# Patient Record
Sex: FEMALE | Race: WHITE | Employment: UNEMPLOYED | URBAN - METROPOLITAN AREA
[De-identification: names, ages, dates, MRNs, and addresses within clinical notes are randomized per-mention and may not be internally consistent; named-entity substitution may affect disease eponyms.]

---
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
## 2024-03-07 ENCOUNTER — APPOINTMENT (OUTPATIENT)
Facility: HOSPITAL | Age: 19
End: 2024-03-07
Payer: MEDICAID

## 2024-03-07 ENCOUNTER — HOSPITAL ENCOUNTER (EMERGENCY)
Facility: HOSPITAL | Age: 19
Discharge: HOME | End: 2024-03-07
Payer: MEDICAID

## 2024-03-07 VITALS
BODY MASS INDEX: 24.16 KG/M2 | OXYGEN SATURATION: 96 % | RESPIRATION RATE: 16 BRPM | WEIGHT: 127.87 LBS | SYSTOLIC BLOOD PRESSURE: 103 MMHG | TEMPERATURE: 98 F | DIASTOLIC BLOOD PRESSURE: 65 MMHG | HEART RATE: 81 BPM

## 2024-03-07 DIAGNOSIS — S69.82XA HYPEREXTENSION INJURY OF FINGER OF LEFT HAND: ICD-10-CM

## 2024-03-07 DIAGNOSIS — S63.602A SPRAIN OF LEFT THUMB, UNSPECIFIED SITE OF DIGIT, INITIAL ENCOUNTER: Primary | ICD-10-CM

## 2024-03-07 PROCEDURE — 99283 EMERGENCY DEPT VISIT LOW MDM: CPT

## 2024-03-07 PROCEDURE — 6370000000 HC RX 637 (ALT 250 FOR IP): Performed by: PHYSICIAN ASSISTANT

## 2024-03-07 PROCEDURE — 73130 X-RAY EXAM OF HAND: CPT

## 2024-03-07 RX ORDER — IBUPROFEN 600 MG/1
600 TABLET ORAL
Status: COMPLETED | OUTPATIENT
Start: 2024-03-07 | End: 2024-03-07

## 2024-03-07 RX ADMIN — IBUPROFEN 600 MG: 600 TABLET, FILM COATED ORAL at 18:42

## 2024-03-07 ASSESSMENT — PAIN SCALES - GENERAL: PAINLEVEL_OUTOF10: 7

## 2024-03-07 NOTE — ED PROVIDER NOTES
[facility] EMERGENCY DEPT  EMERGENCY DEPARTMENT ENCOUNTER       Pt Name: Filomena Quintero  MRN: [id number]  Birthdate 2005  Date of Evaluation: 3/7/2024  Provider: Areli Ness PA-C   PCP: None, None  Note Started: 7:56 PM 3/7/24     CHIEF COMPLAINT       Chief Complaint   Patient presents with    Hand Injury     Pt hyperextended L thumb while trying to help her cat,no new scratches or bites        HISTORY OF PRESENT ILLNESS: 1 or more elements      History From: Patient  None     Filomena Quintero is a 18 y.o. female who presents to the ED today hyperextension injury to her left thumb.  Patient states that her cat was trying to eat a wire.  She states that she tried to spank her cat.  Hit her left thumb on the carpet and hyperextension injury.  Felt a pop sensation.  Has had pain since.  Has not taken anything prior to arrival for pain.  No other constitutional symptoms reported     Nursing Notes were all reviewed and agreed with or any disagreements were addressed in the HPI.     REVIEW OF SYSTEMS      Review of Systems     Positives and Pertinent negatives as per HPI.    PAST HISTORY     Past Medical History:  Past Medical History:   Diagnosis Date    Elbow fracture 2014    right, fell on playground, healed without residual       Past Surgical History:  History reviewed. No pertinent surgical history.    Family History:  Family History   Problem Relation Age of Onset    No Known Problems Father     Celiac Disease Mother     Cancer Maternal Grandmother     No Known Problems Sister        Social History:  Social History     Tobacco Use    Smoking status: Never    Smokeless tobacco: Never   Substance Use Topics    Alcohol use: Never    Drug use: No       Allergies:  No Known Allergies    CURRENT MEDICATIONS      Previous Medications    ACETAMINOPHEN (AMINOFEN) 325 MG TABLET    Take 2 tablets by mouth every 6 hours as needed for Pain    IBUPROFEN (IBU) 600 MG TABLET    Take 1 tablet by mouth every 6 hours

## 2024-04-29 ENCOUNTER — HOSPITAL ENCOUNTER (EMERGENCY)
Facility: HOSPITAL | Age: 19
Discharge: HOME | End: 2024-04-29
Payer: MEDICAID

## 2024-04-29 VITALS
WEIGHT: 126.76 LBS | HEART RATE: 72 BPM | TEMPERATURE: 99 F | BODY MASS INDEX: 23.95 KG/M2 | DIASTOLIC BLOOD PRESSURE: 73 MMHG | SYSTOLIC BLOOD PRESSURE: 102 MMHG | RESPIRATION RATE: 18 BRPM | OXYGEN SATURATION: 100 %

## 2024-04-29 DIAGNOSIS — S09.90XA INJURY OF HEAD, INITIAL ENCOUNTER: Primary | ICD-10-CM

## 2024-04-29 DIAGNOSIS — R51.9 ACUTE INTRACTABLE HEADACHE, UNSPECIFIED HEADACHE TYPE: ICD-10-CM

## 2024-04-29 PROCEDURE — 6370000000 HC RX 637 (ALT 250 FOR IP)

## 2024-04-29 PROCEDURE — 99283 EMERGENCY DEPT VISIT LOW MDM: CPT

## 2024-04-29 RX ORDER — IBUPROFEN 600 MG/1
600 TABLET ORAL
Status: COMPLETED | OUTPATIENT
Start: 2024-04-29 | End: 2024-04-29

## 2024-04-29 RX ORDER — BUTALBITAL, ACETAMINOPHEN AND CAFFEINE 50; 325; 40 MG/1; MG/1; MG/1
1 TABLET ORAL EVERY 4 HOURS PRN
Qty: 20 TABLET | Refills: 0 | Status: SHIPPED | OUTPATIENT
Start: 2024-04-29

## 2024-04-29 RX ORDER — ACETAMINOPHEN 325 MG/1
650 TABLET ORAL
Status: COMPLETED | OUTPATIENT
Start: 2024-04-29 | End: 2024-04-29

## 2024-04-29 RX ORDER — BUTALBITAL, ACETAMINOPHEN AND CAFFEINE 50; 325; 40 MG/1; MG/1; MG/1
1 TABLET ORAL
Status: COMPLETED | OUTPATIENT
Start: 2024-04-29 | End: 2024-04-29

## 2024-04-29 RX ORDER — IBUPROFEN 600 MG/1
600 TABLET ORAL EVERY 6 HOURS PRN
Qty: 40 TABLET | Refills: 0 | Status: SHIPPED | OUTPATIENT
Start: 2024-04-29

## 2024-04-29 RX ORDER — ACETAMINOPHEN 500 MG
500 TABLET ORAL EVERY 4 HOURS PRN
Qty: 540 TABLET | Refills: 1 | Status: SHIPPED | OUTPATIENT
Start: 2024-04-29

## 2024-04-29 RX ADMIN — BUTALBITAL, ACETAMINOPHEN, AND CAFFEINE 1 TABLET: 50; 325; 40 TABLET ORAL at 09:47

## 2024-04-29 RX ADMIN — ACETAMINOPHEN 650 MG: 325 TABLET ORAL at 09:47

## 2024-04-29 RX ADMIN — IBUPROFEN 600 MG: 600 TABLET, FILM COATED ORAL at 09:47

## 2024-04-29 ASSESSMENT — PAIN SCALES - GENERAL
PAINLEVEL_OUTOF10: 4
PAINLEVEL_OUTOF10: 4

## 2024-04-29 NOTE — ED NOTES
Pt discharged by DARIEN Lynn. Discharge instructions discussed and pt given opportunity to ask questions. Pt ambulatory out of ED with mother

## 2024-04-29 NOTE — ED PROVIDER NOTES
Unless otherwise noted above, none.  Procedures      CRITICAL CARE TIME   Patient does not meet Critical Care Time, 0 minutes    ED IMPRESSION     1. Injury of head, initial encounter    2. Acute intractable headache, unspecified headache type          DISPOSITION/PLAN   DISPOSITION Decision To Discharge 04/29/2024 10:36:32 AM    Discharge Note: The patient is stable for discharge home. The signs, symptoms, diagnosis, and discharge instructions have been discussed, understanding conveyed, and agreed upon. The patient is to follow up as recommended or return to ER should their symptoms worsen.      PATIENT REFERRED TO:  Your primary care provider      As needed        DISCHARGE MEDICATIONS:     Medication List        START taking these medications      butalbital-acetaminophen-caffeine -40 MG per tablet  Commonly known as: FIORICET, ESGIC  Take 1 tablet by mouth every 4 hours as needed for Headaches            CHANGE how you take these medications      * acetaminophen 325 MG tablet  Commonly known as: Aminofen  Take 2 tablets by mouth every 6 hours as needed for Pain  What changed: Another medication with the same name was added. Make sure you understand how and when to take each.     * acetaminophen 500 MG tablet  Commonly known as: TYLENOL  Take 1 tablet by mouth every 4 hours as needed for Pain  What changed: You were already taking a medication with the same name, and this prescription was added. Make sure you understand how and when to take each.     * ibuprofen 600 MG tablet  Commonly known as: IBU  Take 1 tablet by mouth every 6 hours as needed for Pain or Fever  What changed: Another medication with the same name was added. Make sure you understand how and when to take each.     * ibuprofen 600 MG tablet  Commonly known as: ADVIL;MOTRIN  Take 1 tablet by mouth every 6 hours as needed for Pain  What changed: You were already taking a medication with the same name, and this prescription was added. Make

## 2024-07-18 ENCOUNTER — OFFICE VISIT (OUTPATIENT)
Facility: CLINIC | Age: 19
End: 2024-07-18
Payer: MEDICAID

## 2024-07-18 VITALS
HEART RATE: 73 BPM | OXYGEN SATURATION: 98 % | SYSTOLIC BLOOD PRESSURE: 98 MMHG | TEMPERATURE: 98 F | WEIGHT: 121.4 LBS | RESPIRATION RATE: 16 BRPM | DIASTOLIC BLOOD PRESSURE: 56 MMHG | HEIGHT: 61 IN | BODY MASS INDEX: 22.92 KG/M2

## 2024-07-18 DIAGNOSIS — K90.49 FOOD INTOLERANCE: Primary | ICD-10-CM

## 2024-07-18 DIAGNOSIS — R53.83 OTHER FATIGUE: ICD-10-CM

## 2024-07-18 DIAGNOSIS — R10.84 GENERALIZED ABDOMINAL PAIN: ICD-10-CM

## 2024-07-18 LAB
COMMENT:: NORMAL
SPECIMEN HOLD: NORMAL

## 2024-07-18 PROCEDURE — 99215 OFFICE O/P EST HI 40 MIN: CPT | Performed by: NURSE PRACTITIONER

## 2024-07-18 SDOH — ECONOMIC STABILITY: FOOD INSECURITY: WITHIN THE PAST 12 MONTHS, YOU WORRIED THAT YOUR FOOD WOULD RUN OUT BEFORE YOU GOT MONEY TO BUY MORE.: NEVER TRUE

## 2024-07-18 SDOH — ECONOMIC STABILITY: FOOD INSECURITY: WITHIN THE PAST 12 MONTHS, THE FOOD YOU BOUGHT JUST DIDN'T LAST AND YOU DIDN'T HAVE MONEY TO GET MORE.: NEVER TRUE

## 2024-07-18 SDOH — ECONOMIC STABILITY: HOUSING INSECURITY
IN THE LAST 12 MONTHS, WAS THERE A TIME WHEN YOU DID NOT HAVE A STEADY PLACE TO SLEEP OR SLEPT IN A SHELTER (INCLUDING NOW)?: NO

## 2024-07-18 SDOH — ECONOMIC STABILITY: INCOME INSECURITY: HOW HARD IS IT FOR YOU TO PAY FOR THE VERY BASICS LIKE FOOD, HOUSING, MEDICAL CARE, AND HEATING?: SOMEWHAT HARD

## 2024-07-18 ASSESSMENT — PATIENT HEALTH QUESTIONNAIRE - PHQ9
1. LITTLE INTEREST OR PLEASURE IN DOING THINGS: SEVERAL DAYS
SUM OF ALL RESPONSES TO PHQ9 QUESTIONS 1 & 2: 2
SUM OF ALL RESPONSES TO PHQ QUESTIONS 1-9: 2
2. FEELING DOWN, DEPRESSED OR HOPELESS: SEVERAL DAYS
SUM OF ALL RESPONSES TO PHQ QUESTIONS 1-9: 2

## 2024-07-18 NOTE — PROGRESS NOTES
Filomena Quintero is a 18 y.o. female who presents with her mother to [facility].  Chief Complaint   Patient presents with    New Patient     Feels sick after eating meat and milk - unsure if allergic   Declines tdap       HPI  Former patient of Radhayaneth Marcelinoes    She is single, recently graduated HS - no work or college plans just yet  She is a non smoker, no etoh or drugs  Not currently dating, not yet sexually active    No significant MHx but does admit hx s/s depression/anxiety    She has one primary concern today: \"Stomach issues\"  Started about a year ago  She develops nausea with eating red meat  No vomiting or diarrhea  No abdominal pain    She does report hx insect bite of right abdomen last year, thought by ED to be spider bite    She also reports feeling queasy with drinking milk    Mom has celiac dz - Filomena does not necessarily have trouble with gluten    Will check celiac, alpha gal, blood counts, iron panel, TSH CMP, today    HM  Declines Tdap update  Reports she did have HPV vaccines         Vitals:    07/18/24 1349 07/18/24 1431   BP: (!) 103/97 (!) 98/56   Site: Right Upper Arm Right Upper Arm   Position: Sitting Sitting   Cuff Size: Medium Adult    Pulse: 73    Resp: 16    Temp: 98 °F (36.7 °C)    TempSrc: Oral    SpO2: 98%    Weight: 55.1 kg (121 lb 6.4 oz)    Height: 1.549 m (5' 1\")       Wt Readings from Last 3 Encounters:   07/18/24 55.1 kg (121 lb 6.4 oz) (37 %, Z= -0.32)*   04/29/24 57.5 kg (126 lb 12.2 oz) (49 %, Z= -0.02)*   03/07/24 58 kg (127 lb 13.9 oz) (52 %, Z= 0.05)*     * Growth percentiles are based on CDC (Girls, 2-20 Years) data.      Body mass index is 22.94 kg/m².     Review of Systems   Constitutional: Negative for headache, fever, and unexpected weight change.  Eyes: Negative for visual changes.  Respiratory: Negative for shortness of breath, wheezing.  Cardiovascular: Negative for chest pain, palpitations, shortness of breath, and leg swelling.  GI/: No changes in bowels

## 2024-07-18 NOTE — PROGRESS NOTES
\"Have you been to the ER, urgent care clinic since your last visit?  Hospitalized since your last visit?\"    ER visit from march and april    “Have you seen or consulted any other health care providers outside of [facility] since your last visit?”    NO            Click Here for Release of Records Request

## 2024-07-19 LAB
ALBUMIN SERPL-MCNC: 4.5 G/DL (ref 3.5–5)
ALBUMIN/GLOB SERPL: 1.7 (ref 1.1–2.2)
ALP SERPL-CCNC: 87 U/L (ref 45–117)
ALT SERPL-CCNC: 24 U/L (ref 12–78)
ANION GAP SERPL CALC-SCNC: 6 MMOL/L (ref 5–15)
AST SERPL-CCNC: 12 U/L (ref 15–37)
BASOPHILS # BLD: 0 K/UL (ref 0–0.1)
BASOPHILS NFR BLD: 1 % (ref 0–1)
BILIRUB SERPL-MCNC: 0.4 MG/DL (ref 0.2–1)
BUN SERPL-MCNC: 9 MG/DL (ref 6–20)
BUN/CREAT SERPL: 11 (ref 12–20)
CALCIUM SERPL-MCNC: 9.5 MG/DL (ref 8.5–10.1)
CHLORIDE SERPL-SCNC: 106 MMOL/L (ref 97–108)
CO2 SERPL-SCNC: 29 MMOL/L (ref 21–32)
CREAT SERPL-MCNC: 0.82 MG/DL (ref 0.55–1.02)
DIFFERENTIAL METHOD BLD: ABNORMAL
EOSINOPHIL # BLD: 0 K/UL (ref 0–0.4)
EOSINOPHIL NFR BLD: 0 % (ref 0–7)
ERYTHROCYTE [DISTWIDTH] IN BLOOD BY AUTOMATED COUNT: 12.7 % (ref 11.5–14.5)
GLOBULIN SER CALC-MCNC: 2.6 G/DL (ref 2–4)
GLUCOSE SERPL-MCNC: 108 MG/DL (ref 65–100)
HCT VFR BLD AUTO: 36.1 % (ref 35–47)
HGB BLD-MCNC: 12.6 G/DL (ref 11.5–16)
IMM GRANULOCYTES # BLD AUTO: 0 K/UL (ref 0–0.04)
IMM GRANULOCYTES NFR BLD AUTO: 1 % (ref 0–0.5)
IRON SATN MFR SERPL: 15 % (ref 20–50)
IRON SERPL-MCNC: 56 UG/DL (ref 35–150)
LYMPHOCYTES # BLD: 2.2 K/UL (ref 0.8–3.5)
LYMPHOCYTES NFR BLD: 33 % (ref 12–49)
MCH RBC QN AUTO: 30 PG (ref 26–34)
MCHC RBC AUTO-ENTMCNC: 34.9 G/DL (ref 30–36.5)
MCV RBC AUTO: 86 FL (ref 80–99)
MONOCYTES # BLD: 0.6 K/UL (ref 0–1)
MONOCYTES NFR BLD: 10 % (ref 5–13)
NEUTS SEG # BLD: 3.7 K/UL (ref 1.8–8)
NEUTS SEG NFR BLD: 55 % (ref 32–75)
NRBC # BLD: 0 K/UL (ref 0–0.01)
NRBC BLD-RTO: 0 PER 100 WBC
PLATELET # BLD AUTO: 270 K/UL (ref 150–400)
PMV BLD AUTO: 9.5 FL (ref 8.9–12.9)
POTASSIUM SERPL-SCNC: 3.8 MMOL/L (ref 3.5–5.1)
PROT SERPL-MCNC: 7.1 G/DL (ref 6.4–8.2)
RBC # BLD AUTO: 4.2 M/UL (ref 3.8–5.2)
SODIUM SERPL-SCNC: 141 MMOL/L (ref 136–145)
T4 FREE SERPL-MCNC: 1.2 NG/DL (ref 0.8–1.5)
TIBC SERPL-MCNC: 370 UG/DL (ref 250–450)
TSH SERPL DL<=0.05 MIU/L-ACNC: 2.79 UIU/ML (ref 0.36–3.74)
WBC # BLD AUTO: 6.5 K/UL (ref 3.6–11)

## 2024-07-20 LAB — Lab: NORMAL

## 2024-07-21 LAB
ENDOMYSIUM IGA SER QL: NEGATIVE
IGA SERPL-MCNC: 122 MG/DL (ref 87–352)
TTG IGA SER-ACNC: <2 U/ML (ref 0–3)

## 2024-07-22 ENCOUNTER — TELEPHONE (OUTPATIENT)
Facility: CLINIC | Age: 19
End: 2024-07-22

## 2024-07-22 NOTE — TELEPHONE ENCOUNTER
I called pt and LVM for pt to call me back about lab results.     Per Hansa.   Labs look fine, celiac screen is negative  Thyroid is WNL  Alpha gal allergy test is pending   pt denies any changes in vision

## 2024-07-23 LAB
ALLERGEN LAMB/MUTTON, IGE, AGAL3: <0.1 KU/L
ALPHA-GAL IGE QN: <0.1 KU/L
BEEF IGE QN: <0.1 KU/L
IGE SERPL-ACNC: 9 IU/ML (ref 6–495)
Lab: NORMAL
PORK IGE QN: <0.1 KU/L

## 2024-07-23 NOTE — TELEPHONE ENCOUNTER
I called and spoke to pt. Pt has additional questions about labs. Pt was wondering why Iron is low and Glucose was high.

## 2024-07-24 NOTE — TELEPHONE ENCOUNTER
Please let her know her iron saturation is a little low - but iron and blood counts are normal.  Will keep an eye on this, but not concerning.  Please let her know there glucose was likely >100 b/c she was non-fasting.  If she would like, she can come in and we can check her A1C with lab visit only, or we can wait until her physical in December.  Let me know what she prefers.

## 2024-08-07 ENCOUNTER — TELEPHONE (OUTPATIENT)
Facility: CLINIC | Age: 19
End: 2024-08-07

## 2024-08-07 DIAGNOSIS — R73.09 BLOOD GLUCOSE ABNORMAL: Primary | ICD-10-CM

## 2024-08-07 NOTE — TELEPHONE ENCOUNTER
I called and spoke to pt, verified name and . I called and verified the patient by name and date of birth, then proceeded with the message from the provider. The patient understood and had no questions at this time.     Pt would like to come in for a lab visit to check A1C.     Per Hansa:   Your iron saturation is a little low - but iron and blood counts are normal.  Will keep an eye on this, but not concerning.Your glucose was likely greater than 100 b/c you was non-fasting.If you would like, you can come in and we can check your A1C with lab visit only, or we can wait until your physical in December.

## 2025-01-22 ENCOUNTER — HOSPITAL ENCOUNTER (EMERGENCY)
Facility: HOSPITAL | Age: 20
Discharge: HOME | End: 2025-01-22
Payer: MEDICAID

## 2025-01-22 VITALS
RESPIRATION RATE: 17 BRPM | OXYGEN SATURATION: 99 % | BODY MASS INDEX: 20.19 KG/M2 | SYSTOLIC BLOOD PRESSURE: 109 MMHG | HEIGHT: 61 IN | DIASTOLIC BLOOD PRESSURE: 71 MMHG | HEART RATE: 83 BPM | TEMPERATURE: 97.7 F | WEIGHT: 106.92 LBS

## 2025-01-22 DIAGNOSIS — J06.9 ACUTE UPPER RESPIRATORY INFECTION: Primary | ICD-10-CM

## 2025-01-22 LAB
FLUAV RNA SPEC QL NAA+PROBE: NOT DETECTED
FLUBV RNA SPEC QL NAA+PROBE: NOT DETECTED
SARS-COV-2 RNA RESP QL NAA+PROBE: NOT DETECTED
SOURCE: NORMAL

## 2025-01-22 PROCEDURE — 87636 SARSCOV2 & INF A&B AMP PRB: CPT

## 2025-01-22 PROCEDURE — 6370000000 HC RX 637 (ALT 250 FOR IP): Performed by: PHYSICIAN ASSISTANT

## 2025-01-22 PROCEDURE — 99283 EMERGENCY DEPT VISIT LOW MDM: CPT

## 2025-01-22 RX ORDER — ACETAMINOPHEN 325 MG/1
650 TABLET ORAL
Status: COMPLETED | OUTPATIENT
Start: 2025-01-22 | End: 2025-01-22

## 2025-01-22 RX ADMIN — ACETAMINOPHEN 650 MG: 325 TABLET ORAL at 12:04

## 2025-01-22 ASSESSMENT — PAIN SCALES - GENERAL: PAINLEVEL_OUTOF10: 0

## 2025-01-22 NOTE — DISCHARGE INSTRUCTIONS
Thank You!    It was a pleasure taking care of you in our Emergency Department today. We know that when you come to our Emergency Department, you are entrusting us with your health, comfort, and safety. Our physicians and nurses honor that trust, and truly appreciate the opportunity to care for you and your loved ones.      We also value your feedback. If you receive a survey about your Emergency Department experience today, please fill it out.  We care about our patients' feedback, and we listen to what you have to say.  Thank you.    MIRTHA Bazan  ________________________________________________________________________  I have included a copy of your lab results and/or radiologic studies from today's visit so you can have them easily available at your follow-up visit. We hope you feel better and please do not hesitate to contact the ED if you have any questions at all!    Recent Results (from the past 12 hour(s))   COVID-19 & Influenza Combo    Collection Time: 01/22/25 11:54 AM    Specimen: Nasopharyngeal   Result Value Ref Range    Source Nasopharyngeal      SARS-CoV-2, PCR Not detected NOTD      Rapid Influenza A By PCR Not detected NOTD      Rapid Influenza B By PCR Not detected NOTD       The exam and treatment you received in the Emergency Department were for an urgent problem and are not intended as complete care. It is important that you follow up with a doctor, nurse practitioner, or physician assistant for ongoing care. If your symptoms become worse or you do not improve as expected and you are unable to reach your usual health care provider, you should return to the Emergency Department. We are available 24 hours a day.    Please take your discharge instructions with you when you go to your follow-up appointment.     If a prescription has been provided, please have it filled as soon as possible to prevent a delay in treatment. Read the entire medication instruction sheet provided to you by the

## 2025-01-30 ENCOUNTER — HOSPITAL ENCOUNTER (EMERGENCY)
Facility: HOSPITAL | Age: 20
Discharge: LEFT BEFORE DISCHARGE | End: 2025-01-30

## 2025-01-30 VITALS
BODY MASS INDEX: 20.86 KG/M2 | SYSTOLIC BLOOD PRESSURE: 115 MMHG | WEIGHT: 106.26 LBS | DIASTOLIC BLOOD PRESSURE: 63 MMHG | RESPIRATION RATE: 18 BRPM | OXYGEN SATURATION: 97 % | HEART RATE: 109 BPM | HEIGHT: 60 IN | TEMPERATURE: 98.2 F

## 2025-01-30 PROCEDURE — 4500000002 HC ER NO CHARGE

## 2025-01-30 ASSESSMENT — PAIN SCALES - GENERAL: PAINLEVEL_OUTOF10: 4

## 2025-01-31 ENCOUNTER — OFFICE VISIT (OUTPATIENT)
Facility: CLINIC | Age: 20
End: 2025-01-31
Payer: MEDICAID

## 2025-01-31 VITALS
HEIGHT: 60 IN | RESPIRATION RATE: 16 BRPM | HEART RATE: 81 BPM | SYSTOLIC BLOOD PRESSURE: 88 MMHG | BODY MASS INDEX: 20.89 KG/M2 | TEMPERATURE: 97.9 F | OXYGEN SATURATION: 100 % | DIASTOLIC BLOOD PRESSURE: 54 MMHG | WEIGHT: 106.4 LBS

## 2025-01-31 DIAGNOSIS — N94.6 MENSTRUAL CRAMPS: Primary | ICD-10-CM

## 2025-01-31 PROCEDURE — 99213 OFFICE O/P EST LOW 20 MIN: CPT | Performed by: INTERNAL MEDICINE

## 2025-01-31 NOTE — ED NOTES
Pt advised she would like to not be seen since she cannot be seen in the same room as her mother.

## 2025-01-31 NOTE — PROGRESS NOTES
Filomena Quintero  Identified pt with two pt identifiers(name and ).  Chief Complaint   Patient presents with    Dysmenorrhea     She states that on  she started her periods and used some scented pads.  She then developed abdominal cramping.  No rash or itching.  She is on the tail end of her periods but continues to have some cramping.  It is mild.  She denies any fever chills   Chief Complaint   Patient presents with    Dysmenorrhea           Current Outpatient Medications   Medication Sig Dispense Refill    acetaminophen (TYLENOL) 500 MG tablet Take 1 tablet by mouth every 4 hours as needed for Pain 540 tablet 1    ibuprofen (IBU) 600 MG tablet Take 1 tablet by mouth every 6 hours as needed for Pain or Fever 30 tablet 0    butalbital-acetaminophen-caffeine (FIORICET, ESGIC) -40 MG per tablet Take 1 tablet by mouth every 4 hours as needed for Headaches (Patient not taking: Reported on 2025) 20 tablet 0    acetaminophen (AMINOFEN) 325 MG tablet Take 2 tablets by mouth every 6 hours as needed for Pain 60 tablet 0     No current facility-administered medications for this visit.        No Known Allergies    Past Medical History:   Diagnosis Date    Anxiety     Depression     Elbow fracture     right, fell on playground, healed without residual      History reviewed. No pertinent surgical history.   Family History   Problem Relation Age of Onset    Celiac Disease Mother     No Known Problems Father     No Known Problems Sister     No Known Problems Brother     Colon Cancer Maternal Grandmother     No Known Problems Maternal Grandfather     No Known Problems Paternal Grandmother     No Known Problems Paternal Grandfather     Heart Attack Neg Hx     Stroke Neg Hx       Social History     Socioeconomic History    Marital status: Single     Spouse name: None    Number of children: 0    Years of education: None    Highest education level: None   Occupational History     Employer: [employer]

## 2025-06-25 ENCOUNTER — TELEPHONE (OUTPATIENT)
Facility: CLINIC | Age: 20
End: 2025-06-25

## 2025-06-25 NOTE — TELEPHONE ENCOUNTER
ECC transferred pt's mom to me. I spoke to pt's mom ( On HIPAA) she verified pt by name and . Pt's mom stated pt has a bump on her chest that is painful. She is not sure how big it is or how long it has been there. I let her know I would send this to Marlee PSR to make apt for pt. No further questions.

## 2025-06-27 ENCOUNTER — OFFICE VISIT (OUTPATIENT)
Facility: CLINIC | Age: 20
End: 2025-06-27
Payer: MEDICAID

## 2025-06-27 VITALS
RESPIRATION RATE: 16 BRPM | TEMPERATURE: 98.1 F | DIASTOLIC BLOOD PRESSURE: 62 MMHG | OXYGEN SATURATION: 98 % | SYSTOLIC BLOOD PRESSURE: 100 MMHG | HEART RATE: 69 BPM | WEIGHT: 106 LBS | BODY MASS INDEX: 20.7 KG/M2

## 2025-06-27 DIAGNOSIS — N63.15 BREAST LUMP ON RIGHT SIDE AT 9 O'CLOCK POSITION: ICD-10-CM

## 2025-06-27 DIAGNOSIS — N63.25 MASS OVERLAPPING MULTIPLE QUADRANTS OF LEFT BREAST: Primary | ICD-10-CM

## 2025-06-27 PROCEDURE — 99214 OFFICE O/P EST MOD 30 MIN: CPT | Performed by: NURSE PRACTITIONER

## 2025-06-27 NOTE — PROGRESS NOTES
\"Have you been to the ER, urgent care clinic since your last visit?  Hospitalized since your last visit?\"    NO    “Have you seen or consulted any other health care providers outside of [facility] since your last visit?”    NO            Click Here for Release of Records Request  
MD      Patient Care Team:  Hansa Mills APRN - CNP as PCP - General (Nurse Practitioner)  Hansa Mills APRN - CNP as PCP - Empaneled Provider       Time Documentation  30 minutes spent obtaining and reviewing outside records and PMH, participating in direct face to face encounter, counseling and educating the patient, ordering medications and medical tests/procedures, coordinating care with outside healthcare providers, and documenting clinical information in the EHR.

## 2025-07-01 ENCOUNTER — HOSPITAL ENCOUNTER (OUTPATIENT)
Facility: HOSPITAL | Age: 20
Discharge: HOME | End: 2025-07-04
Payer: MEDICAID

## 2025-07-01 DIAGNOSIS — N63.15 BREAST LUMP ON RIGHT SIDE AT 9 O'CLOCK POSITION: ICD-10-CM

## 2025-07-01 DIAGNOSIS — N63.25 MASS OVERLAPPING MULTIPLE QUADRANTS OF LEFT BREAST: ICD-10-CM

## 2025-07-01 PROCEDURE — 76642 ULTRASOUND BREAST LIMITED: CPT

## 2025-07-02 ENCOUNTER — RESULTS FOLLOW-UP (OUTPATIENT)
Facility: CLINIC | Age: 20
End: 2025-07-02

## 2025-07-02 DIAGNOSIS — D24.2 FIBROADENOMA OF BOTH BREASTS: Primary | ICD-10-CM

## 2025-07-02 DIAGNOSIS — D24.1 FIBROADENOMA OF BOTH BREASTS: Primary | ICD-10-CM

## 2025-07-02 NOTE — RESULT ENCOUNTER NOTE
Please call patient (with her mom if needed), and let them know the palpable nodules are likely BENIGN and we can repeat the ultrasounds every 6 months for 2 years as recommended by the radiologist to be sure they are not changing.  I have entered the order for her January 2026 repeat U/S.

## 2025-07-31 ENCOUNTER — OFFICE VISIT (OUTPATIENT)
Facility: CLINIC | Age: 20
End: 2025-07-31
Payer: MEDICAID

## 2025-07-31 VITALS
TEMPERATURE: 97.5 F | OXYGEN SATURATION: 98 % | HEIGHT: 60 IN | BODY MASS INDEX: 20.62 KG/M2 | HEART RATE: 75 BPM | WEIGHT: 105 LBS | RESPIRATION RATE: 12 BRPM | SYSTOLIC BLOOD PRESSURE: 100 MMHG | DIASTOLIC BLOOD PRESSURE: 62 MMHG

## 2025-07-31 DIAGNOSIS — R10.13 EPIGASTRIC PAIN: ICD-10-CM

## 2025-07-31 DIAGNOSIS — D24.2 FIBROADENOMA OF BOTH BREASTS: ICD-10-CM

## 2025-07-31 DIAGNOSIS — F32.89 OTHER DEPRESSION: ICD-10-CM

## 2025-07-31 DIAGNOSIS — Z00.00 ENCOUNTER FOR WELL ADULT EXAM WITHOUT ABNORMAL FINDINGS: Primary | ICD-10-CM

## 2025-07-31 DIAGNOSIS — D24.1 FIBROADENOMA OF BOTH BREASTS: ICD-10-CM

## 2025-07-31 DIAGNOSIS — K59.01 SLOW TRANSIT CONSTIPATION: ICD-10-CM

## 2025-07-31 PROBLEM — F32.A DEPRESSION: Status: ACTIVE | Noted: 2025-07-31

## 2025-07-31 PROCEDURE — 99395 PREV VISIT EST AGE 18-39: CPT | Performed by: NURSE PRACTITIONER

## 2025-07-31 SDOH — ECONOMIC STABILITY: FOOD INSECURITY: WITHIN THE PAST 12 MONTHS, THE FOOD YOU BOUGHT JUST DIDN'T LAST AND YOU DIDN'T HAVE MONEY TO GET MORE.: NEVER TRUE

## 2025-07-31 SDOH — ECONOMIC STABILITY: FOOD INSECURITY: WITHIN THE PAST 12 MONTHS, YOU WORRIED THAT YOUR FOOD WOULD RUN OUT BEFORE YOU GOT MONEY TO BUY MORE.: NEVER TRUE

## 2025-07-31 ASSESSMENT — PATIENT HEALTH QUESTIONNAIRE - PHQ9
5. POOR APPETITE OR OVEREATING: NOT AT ALL
8. MOVING OR SPEAKING SO SLOWLY THAT OTHER PEOPLE COULD HAVE NOTICED. OR THE OPPOSITE, BEING SO FIGETY OR RESTLESS THAT YOU HAVE BEEN MOVING AROUND A LOT MORE THAN USUAL: MORE THAN HALF THE DAYS
1. LITTLE INTEREST OR PLEASURE IN DOING THINGS: MORE THAN HALF THE DAYS
10. IF YOU CHECKED OFF ANY PROBLEMS, HOW DIFFICULT HAVE THESE PROBLEMS MADE IT FOR YOU TO DO YOUR WORK, TAKE CARE OF THINGS AT HOME, OR GET ALONG WITH OTHER PEOPLE: SOMEWHAT DIFFICULT
SUM OF ALL RESPONSES TO PHQ QUESTIONS 1-9: 14
4. FEELING TIRED OR HAVING LITTLE ENERGY: MORE THAN HALF THE DAYS
SUM OF ALL RESPONSES TO PHQ QUESTIONS 1-9: 14
3. TROUBLE FALLING OR STAYING ASLEEP: SEVERAL DAYS
7. TROUBLE CONCENTRATING ON THINGS, SUCH AS READING THE NEWSPAPER OR WATCHING TELEVISION: NEARLY EVERY DAY
9. THOUGHTS THAT YOU WOULD BE BETTER OFF DEAD, OR OF HURTING YOURSELF: NOT AT ALL
6. FEELING BAD ABOUT YOURSELF - OR THAT YOU ARE A FAILURE OR HAVE LET YOURSELF OR YOUR FAMILY DOWN: MORE THAN HALF THE DAYS
SUM OF ALL RESPONSES TO PHQ QUESTIONS 1-9: 14
SUM OF ALL RESPONSES TO PHQ QUESTIONS 1-9: 14
2. FEELING DOWN, DEPRESSED OR HOPELESS: MORE THAN HALF THE DAYS

## 2025-07-31 NOTE — PROGRESS NOTES
Well Adult Note  Name: Filomena Quintero Today’s Date: 2025   MRN: [id number] Sex: Female   Age: 19 y.o. Ethnicity: Non- / Non    : 2005 Race: White (non-)      Filomena Quintero is here for a well adult exam.       Assessment & Plan   1. Encounter for well adult exam without abnormal findings  2. Fibroadenoma of both breasts  Overview:  2025  U/S bilateral breasts  Masses most suggestive of fibroadenomas. Recommend follow-up ultrasound of both  breasts in 6 months to document stability:   This will be performed every 6 months for 2-year period of time: 2025, 2026, 2026, 2027, 2027  Then baseline mammography will be formed between 35 and 40 years  BI-RADS Category 3, probably benign, short-term follow-up recommended.  3. Epigastric pain  -     H. Pylori Breath Test  4. Other depression  5. Slow transit constipation  Overview:  Increase fiber  Increase fluids  Try prune juice and fibrous vegetables  Consider metamucil           Return in about 6 months (around 2026) for follow up depression/mood/ consider medication.       Subjective   History:  Filomena Quintero is a pleasant 19 y.o. female who presents with her mother Peri Harden for routine annual physical.    Her PHQ9 is elevated  Would like to talk with a counselor instead of considering medication   Will refer to OTC    She also reports constipation at times  Notes constipation is better when she eats fibrous veg like brocolli  Will try to increase fluids and fibrous vegetables  Can use metamucil + stool softener if needed as well    Reviewed medical, surgical, social and family histories.  Reviewed compliance with all medications, reviewed medication allergies.  Problem list reviewed, updated.  Reviewed routine screening recommendations & vaccinations.  Recommend healthy diet & exercise program.  Recommend use of seatbelts & skin protection.    Recommend working smoke detectors and carbon monoxide

## 2025-07-31 NOTE — PATIENT INSTRUCTIONS
Call [facility] for an appt for mood disorder  [phone]              Well Visit, Ages 18 to 65: Care Instructions  Well visits can help you stay healthy. Your doctor has checked your overall health and may have suggested ways to take good care of yourself. Your doctor also may have recommended tests. You can help prevent illness with healthy eating, good sleep, vaccinations, regular exercise, and other steps.    Get the tests that you and your doctor decide on. Depending on your age and risks, examples might include screening for diabetes; hepatitis C; HIV; and cervical, breast, lung, and colon cancer. Screening helps find diseases before any symptoms appear.   Eat healthy foods. Choose fruits, vegetables, whole grains, lean protein, and low-fat dairy foods. Limit saturated fat and reduce salt.     Limit alcohol. Men should have no more than 2 drinks a day. Women should have no more than 1. For some people, no alcohol is the best choice.   Exercise. Get at least 30 minutes of exercise on most days of the week. Walking can be a good choice.     Reach and stay at your healthy weight. This will lower your risk for many health problems.   Take care of your mental health. Try to stay connected with friends, family, and community, and find ways to manage stress.     If you're feeling depressed or hopeless, talk to someone. A counselor can help. If you don't have a counselor, talk to your doctor.   Talk to your doctor if you think you may have a problem with alcohol or drug use. This includes prescription medicines, marijuana, and other drugs.     Avoid tobacco and nicotine: Don't smoke, vape, or chew. If you need help quitting, talk to your doctor.   Practice safer sex. Getting tested, using condoms or dental dams, and limiting sex partners can help prevent STIs.     Use birth control if it's important to you to prevent pregnancy. Talk with your doctor about your choices and what might be best for you.

## 2025-08-05 LAB
H PYLORI BREATH TEST: NORMAL
UREA BREATH TEST QL: NEGATIVE

## 2025-09-04 ENCOUNTER — TELEPHONE (OUTPATIENT)
Facility: CLINIC | Age: 20
End: 2025-09-04